# Patient Record
Sex: MALE | Race: WHITE | Employment: UNEMPLOYED | URBAN - METROPOLITAN AREA
[De-identification: names, ages, dates, MRNs, and addresses within clinical notes are randomized per-mention and may not be internally consistent; named-entity substitution may affect disease eponyms.]

---

## 2023-06-25 ENCOUNTER — HOSPITAL ENCOUNTER (EMERGENCY)
Age: 38
Discharge: HOME | End: 2023-06-25
Attending: EMERGENCY MEDICINE
Payer: COMMERCIAL

## 2023-06-25 ENCOUNTER — APPOINTMENT (OUTPATIENT)
Dept: GENERAL RADIOLOGY | Age: 38
End: 2023-06-25
Payer: COMMERCIAL

## 2023-06-25 VITALS
TEMPERATURE: 97.7 F | OXYGEN SATURATION: 99 % | DIASTOLIC BLOOD PRESSURE: 75 MMHG | HEIGHT: 74 IN | SYSTOLIC BLOOD PRESSURE: 115 MMHG | RESPIRATION RATE: 14 BRPM | HEART RATE: 50 BPM | BODY MASS INDEX: 23.1 KG/M2 | WEIGHT: 180 LBS

## 2023-06-25 DIAGNOSIS — S51.851A CAT BITE OF FOREARM, RIGHT, INITIAL ENCOUNTER: Primary | ICD-10-CM
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
## 2023-06-25 PROCEDURE — 90715 TDAP VACCINE 7 YRS/> IM: CPT | Performed by: EMERGENCY MEDICINE

## 2023-06-25 PROCEDURE — 6360000002 HC RX W HCPCS: Performed by: EMERGENCY MEDICINE

## 2023-06-25 PROCEDURE — 90471 IMMUNIZATION ADMIN: CPT | Performed by: EMERGENCY MEDICINE

## 2023-06-25 PROCEDURE — 73090 X-RAY EXAM OF FOREARM: CPT

## 2023-06-25 PROCEDURE — 99284 EMERGENCY DEPT VISIT MOD MDM: CPT

## 2023-06-25 PROCEDURE — 6370000000 HC RX 637 (ALT 250 FOR IP): Performed by: EMERGENCY MEDICINE

## 2023-06-25 RX ORDER — AMOXICILLIN AND CLAVULANATE POTASSIUM 875; 125 MG/1; MG/1
1 TABLET, FILM COATED ORAL ONCE
Status: COMPLETED | OUTPATIENT
Start: 2023-06-25 | End: 2023-06-25

## 2023-06-25 RX ORDER — AMOXICILLIN AND CLAVULANATE POTASSIUM 875; 125 MG/1; MG/1
1 TABLET, FILM COATED ORAL ONCE
Status: DISCONTINUED | OUTPATIENT
Start: 2023-06-25 | End: 2023-06-25

## 2023-06-25 RX ORDER — IBUPROFEN 400 MG/1
600 TABLET ORAL ONCE
Status: COMPLETED | OUTPATIENT
Start: 2023-06-25 | End: 2023-06-25

## 2023-06-25 RX ORDER — ACETAMINOPHEN 325 MG/1
650 TABLET ORAL ONCE
Status: COMPLETED | OUTPATIENT
Start: 2023-06-25 | End: 2023-06-25

## 2023-06-25 RX ORDER — AMOXICILLIN AND CLAVULANATE POTASSIUM 875; 125 MG/1; MG/1
1 TABLET, FILM COATED ORAL 2 TIMES DAILY
Qty: 20 TABLET | Refills: 0 | Status: SHIPPED | OUTPATIENT
Start: 2023-06-25 | End: 2023-07-05

## 2023-06-25 RX ADMIN — IBUPROFEN 600 MG: 400 TABLET, FILM COATED ORAL at 08:29

## 2023-06-25 RX ADMIN — AMOXICILLIN AND CLAVULANATE POTASSIUM 1 TABLET: 875; 125 TABLET, FILM COATED ORAL at 08:33

## 2023-06-25 RX ADMIN — ACETAMINOPHEN 650 MG: 325 TABLET ORAL at 08:29

## 2023-06-25 RX ADMIN — TETANUS TOXOID, REDUCED DIPHTHERIA TOXOID AND ACELLULAR PERTUSSIS VACCINE, ADSORBED 0.5 ML: 5; 2.5; 8; 8; 2.5 SUSPENSION INTRAMUSCULAR at 08:28

## 2023-06-25 ASSESSMENT — PAIN - FUNCTIONAL ASSESSMENT: PAIN_FUNCTIONAL_ASSESSMENT: 0-10

## 2023-06-25 ASSESSMENT — PAIN SCALES - GENERAL
PAINLEVEL_OUTOF10: 6
PAINLEVEL_OUTOF10: 3

## 2023-06-25 ASSESSMENT — PAIN DESCRIPTION - LOCATION
LOCATION: ARM
LOCATION: HAND

## 2023-06-25 ASSESSMENT — PAIN DESCRIPTION - ORIENTATION: ORIENTATION: RIGHT

## 2023-06-25 ASSESSMENT — PAIN DESCRIPTION - DESCRIPTORS: DESCRIPTORS: DISCOMFORT

## 2023-07-04 ENCOUNTER — HOSPITAL ENCOUNTER (EMERGENCY)
Age: 38
Discharge: HOME | End: 2023-07-04
Attending: EMERGENCY MEDICINE
Payer: COMMERCIAL

## 2023-07-04 VITALS
HEIGHT: 74 IN | RESPIRATION RATE: 16 BRPM | TEMPERATURE: 98.2 F | HEART RATE: 67 BPM | DIASTOLIC BLOOD PRESSURE: 69 MMHG | BODY MASS INDEX: 23.1 KG/M2 | SYSTOLIC BLOOD PRESSURE: 114 MMHG | OXYGEN SATURATION: 96 % | WEIGHT: 180 LBS

## 2023-07-04 DIAGNOSIS — J02.9 ACUTE PHARYNGITIS, UNSPECIFIED ETIOLOGY: Primary | ICD-10-CM

## 2023-07-04 PROCEDURE — 87081 CULTURE SCREEN ONLY: CPT

## 2023-07-04 PROCEDURE — 87430 STREP A AG IA: CPT

## 2023-07-04 PROCEDURE — 99283 EMERGENCY DEPT VISIT LOW MDM: CPT

## 2023-07-04 PROCEDURE — 6360000002 HC RX W HCPCS: Performed by: EMERGENCY MEDICINE

## 2023-07-04 RX ORDER — DEXAMETHASONE 4 MG/1
8 TABLET ORAL ONCE
Status: COMPLETED | OUTPATIENT
Start: 2023-07-04 | End: 2023-07-04

## 2023-07-04 RX ADMIN — DEXAMETHASONE 8 MG: 4 TABLET ORAL at 21:16

## 2023-07-04 ASSESSMENT — PAIN SCALES - GENERAL: PAINLEVEL_OUTOF10: 1

## 2023-07-04 ASSESSMENT — PAIN DESCRIPTION - LOCATION: LOCATION: THROAT

## 2023-07-04 ASSESSMENT — PAIN - FUNCTIONAL ASSESSMENT: PAIN_FUNCTIONAL_ASSESSMENT: 0-10

## 2023-07-05 NOTE — ED PROVIDER NOTES
9:28 PM        CONTINUE these medications which have NOT CHANGED    Details   amoxicillin-clavulanate (AUGMENTIN) 875-125 MG per tablet Take 1 tablet by mouth 2 times daily for 10 days, Disp-20 tablet, R-0Normal           ALLERGIES  No Known Allergies    Nursing notes reviewed by myself for past medical history, family history, social history, surgical history, current medications, and allergies. PHYSICAL EXAM  VITAL SIGNS: Triage VS:    ED Triage Vitals   Enc Vitals Group      BP       Pulse       Resp       Temp       Temp src       SpO2       Weight       Height       Head Circumference       Peak Flow       Pain Score       Pain Loc       Pain Edu? Excl. in [address] [facility] [address]? Constitutional: Well developed, Well nourished, nontoxic appearing  HENT: Normocephalic, Atraumatic, Bilateral external ears normal, mild erythema to posterior oropharynx without exudate, tolerating secretions well, nose normal.   Eyes: Conjunctiva normal, No discharge. No scleral icterus. Neck: Normal range of motion, No tenderness, Supple. Lymphatic: No lymphadenopathy noted. Cardiovascular: Normal heart rate, Normal rhythm, No murmurs, gallops or rubs. Thorax & Lungs: Normal breath sounds, No respiratory distress, No wheezing. Skin: Warm, Dry, Pink, No mottling, No erythema, No rash. Neurologic: Alert & oriented x 3,  No focal deficits noted. Psychiatric: Affect normal, Judgment normal, Mood normal.       RADIOLOGY  Labs Reviewed   STREP SCREEN GROUP A THROAT    Narrative:     SETUP DATE/TIME:       I personally reviewed the images.  The radiologist's interpretation reveals:  Last Imaging results   No orders to display       MEDS GIVEN IN ED:  Medications   dexamethasone (DECADRON) tablet 8 mg (8 mg Oral Given 7/4/23 2116)     COURSE & MEDICAL DECISION MAKING  39-year-old male presents emergency department complaints of sore throat that started 3 hours prior to arrival.  Initial vital signs are reassuring with evidence of fever

## 2023-07-07 LAB
CULTURE: NORMAL
Lab: NORMAL
SPECIMEN: NORMAL
STREP A DIRECT SCREEN: NEGATIVE

## 2023-09-18 ENCOUNTER — APPOINTMENT (OUTPATIENT)
Dept: GENERAL RADIOLOGY | Age: 38
End: 2023-09-18
Payer: COMMERCIAL

## 2023-09-18 ENCOUNTER — HOSPITAL ENCOUNTER (EMERGENCY)
Age: 38
Discharge: HOME | End: 2023-09-18
Attending: EMERGENCY MEDICINE
Payer: COMMERCIAL

## 2023-09-18 VITALS
RESPIRATION RATE: 16 BRPM | HEIGHT: 74 IN | TEMPERATURE: 98.1 F | WEIGHT: 185 LBS | SYSTOLIC BLOOD PRESSURE: 121 MMHG | BODY MASS INDEX: 23.74 KG/M2 | HEART RATE: 63 BPM | DIASTOLIC BLOOD PRESSURE: 80 MMHG | OXYGEN SATURATION: 96 %

## 2023-09-18 DIAGNOSIS — J06.9 UPPER RESPIRATORY TRACT INFECTION, UNSPECIFIED TYPE: Primary | ICD-10-CM

## 2023-09-18 LAB
SARS-COV-2 RDRP RESP QL NAA+PROBE: NOT DETECTED
SOURCE: NORMAL

## 2023-09-18 PROCEDURE — 87081 CULTURE SCREEN ONLY: CPT

## 2023-09-18 PROCEDURE — 6370000000 HC RX 637 (ALT 250 FOR IP): Performed by: EMERGENCY MEDICINE

## 2023-09-18 PROCEDURE — 87635 SARS-COV-2 COVID-19 AMP PRB: CPT

## 2023-09-18 PROCEDURE — 87430 STREP A AG IA: CPT

## 2023-09-18 PROCEDURE — 99284 EMERGENCY DEPT VISIT MOD MDM: CPT

## 2023-09-18 PROCEDURE — 71046 X-RAY EXAM CHEST 2 VIEWS: CPT

## 2023-09-18 RX ORDER — ALBUTEROL SULFATE 90 UG/1
2 AEROSOL, METERED RESPIRATORY (INHALATION) 4 TIMES DAILY PRN
Qty: 18 G | Refills: 0 | Status: SHIPPED | OUTPATIENT
Start: 2023-09-18

## 2023-09-18 RX ORDER — ALBUTEROL SULFATE 90 UG/1
2 AEROSOL, METERED RESPIRATORY (INHALATION) ONCE
Status: COMPLETED | OUTPATIENT
Start: 2023-09-18 | End: 2023-09-18

## 2023-09-18 RX ORDER — GUAIFENESIN 600 MG/1
1200 TABLET, EXTENDED RELEASE ORAL 2 TIMES DAILY
Qty: 40 TABLET | Refills: 0 | Status: SHIPPED | OUTPATIENT
Start: 2023-09-18 | End: 2023-09-28

## 2023-09-18 RX ORDER — PSEUDOEPHEDRINE HCL 30 MG
30 TABLET ORAL EVERY 6 HOURS PRN
Qty: 30 TABLET | Refills: 1 | Status: SHIPPED | OUTPATIENT
Start: 2023-09-18 | End: 2024-09-17

## 2023-09-18 RX ADMIN — ALBUTEROL SULFATE 2 PUFF: 90 AEROSOL, METERED RESPIRATORY (INHALATION) at 14:25

## 2023-09-18 ASSESSMENT — PAIN - FUNCTIONAL ASSESSMENT: PAIN_FUNCTIONAL_ASSESSMENT: 0-10

## 2023-09-18 ASSESSMENT — PAIN SCALES - GENERAL: PAINLEVEL_OUTOF10: 3

## 2023-09-18 NOTE — DISCHARGE INSTRUCTIONS
Return immediately to the emergency department if you experience new or worsened symptoms, chest pain, shortness of breath, fever, or for any other concerns.

## 2023-09-18 NOTE — ED NOTES
Patient verbalizes understanding of discharge instructions. Patient walks out of ED with an upright and steady gait with even and unlabored respirations.       Sandeep Nunez RN  09/18/23 1635

## 2023-09-20 LAB
CULTURE: NORMAL
Lab: NORMAL
SPECIMEN: NORMAL
STREP A DIRECT SCREEN: NEGATIVE

## 2023-10-20 ENCOUNTER — HOSPITAL ENCOUNTER (EMERGENCY)
Age: 38
Discharge: HOME | End: 2023-10-21
Attending: EMERGENCY MEDICINE
Payer: COMMERCIAL

## 2023-10-20 VITALS
WEIGHT: 183 LBS | TEMPERATURE: 97.8 F | HEIGHT: 74 IN | DIASTOLIC BLOOD PRESSURE: 83 MMHG | SYSTOLIC BLOOD PRESSURE: 131 MMHG | OXYGEN SATURATION: 97 % | BODY MASS INDEX: 23.49 KG/M2 | RESPIRATION RATE: 18 BRPM | HEART RATE: 82 BPM

## 2023-10-20 DIAGNOSIS — L72.3 SEBACEOUS CYST: Primary | ICD-10-CM

## 2023-10-20 PROCEDURE — 99283 EMERGENCY DEPT VISIT LOW MDM: CPT

## 2023-10-20 ASSESSMENT — PAIN SCALES - GENERAL: PAINLEVEL_OUTOF10: 1

## 2023-10-20 ASSESSMENT — PAIN DESCRIPTION - ORIENTATION: ORIENTATION: LEFT

## 2023-10-20 ASSESSMENT — PAIN DESCRIPTION - LOCATION: LOCATION: WRIST

## 2023-10-20 ASSESSMENT — PAIN - FUNCTIONAL ASSESSMENT
PAIN_FUNCTIONAL_ASSESSMENT: ACTIVITIES ARE NOT PREVENTED
PAIN_FUNCTIONAL_ASSESSMENT: 0-10

## 2023-10-21 PROCEDURE — 6370000000 HC RX 637 (ALT 250 FOR IP): Performed by: EMERGENCY MEDICINE

## 2023-10-21 RX ORDER — DOXYCYCLINE HYCLATE 100 MG
100 TABLET ORAL 2 TIMES DAILY
Qty: 14 TABLET | Refills: 0 | Status: SHIPPED | OUTPATIENT
Start: 2023-10-21 | End: 2023-10-28

## 2023-10-21 RX ORDER — DOXYCYCLINE HYCLATE 100 MG
100 TABLET ORAL ONCE
Status: COMPLETED | OUTPATIENT
Start: 2023-10-21 | End: 2023-10-21

## 2023-10-21 RX ADMIN — DOXYCYCLINE HYCLATE 100 MG: 100 TABLET, COATED ORAL at 00:19

## 2023-10-21 NOTE — ED PROVIDER NOTES
coordination intact          Psychiatric:  Appropriate      I have reviewed and interpreted all of the currently available lab results from this visit (if applicable):  No results found for this visit on 10/20/23. Radiographs (if obtained):  [] The following radiograph was interpreted by myself in the absence of a radiologist:   [] Radiologist's Report Reviewed:  No orders to display       Procedures:  None      Chart review shows recent radiographs:  No results found. MDM:     Discussion with Other Professionals : None    Social Determinants: None    Records Reviewed : None    Chronic conditions affecting care: None    diagnostics differed at this time based on clinical judgement and/or after discussion with patient/patient's family    Patient was given the following medications:  Medications   doxycycline hyclate (VIBRA-TABS) tablet 100 mg (has no administration in time range)       Disposition Discussion:  I suspect a  sebaceous cyst but possibly is infected with the erythema and warmth to the touch. Will cover with antibiotics as such. He has slightly enlarged lymph node on neck exam but this appears to be a chronic issue for him. I encouraged him to follow-up closely with his primary care doctor and return here with any change or worsening. I did give him referrals to specialist but have also asked him to coordinate with his primary care doctor first before following up. .  Does not require any further emergent work-up at this point. Plan is to discharge home with expectation to follow close with primary care doctor/orthpedist.  Recommend RICE therapy. Also recommend supportive care measures at home. Return precaution provided. All questions and concerns answered at this time. Is this patient to be included in the SEP-1 core measure due to severe sepsis or septic shock? No Exclusion criteria - the patient is NOT to be included for SEP-1 Core Measure due to:  Infection is not suspected

## 2023-11-18 ENCOUNTER — APPOINTMENT (OUTPATIENT)
Dept: GENERAL RADIOLOGY | Age: 38
End: 2023-11-18
Payer: COMMERCIAL

## 2023-11-18 ENCOUNTER — HOSPITAL ENCOUNTER (EMERGENCY)
Age: 38
Discharge: HOME | End: 2023-11-19
Attending: EMERGENCY MEDICINE
Payer: COMMERCIAL

## 2023-11-18 VITALS
BODY MASS INDEX: 24.38 KG/M2 | DIASTOLIC BLOOD PRESSURE: 91 MMHG | HEIGHT: 74 IN | HEART RATE: 76 BPM | SYSTOLIC BLOOD PRESSURE: 134 MMHG | WEIGHT: 190 LBS | TEMPERATURE: 97.6 F | OXYGEN SATURATION: 98 % | RESPIRATION RATE: 18 BRPM

## 2023-11-18 DIAGNOSIS — M25.532 LEFT WRIST PAIN: Primary | ICD-10-CM

## 2023-11-18 PROCEDURE — 6360000002 HC RX W HCPCS: Performed by: EMERGENCY MEDICINE

## 2023-11-18 PROCEDURE — 96372 THER/PROPH/DIAG INJ SC/IM: CPT

## 2023-11-18 PROCEDURE — 99284 EMERGENCY DEPT VISIT MOD MDM: CPT

## 2023-11-18 PROCEDURE — 6370000000 HC RX 637 (ALT 250 FOR IP): Performed by: EMERGENCY MEDICINE

## 2023-11-18 PROCEDURE — 73110 X-RAY EXAM OF WRIST: CPT

## 2023-11-18 RX ORDER — ACETAMINOPHEN 500 MG
1000 TABLET ORAL ONCE
Status: COMPLETED | OUTPATIENT
Start: 2023-11-19 | End: 2023-11-18

## 2023-11-18 RX ORDER — KETOROLAC TROMETHAMINE 15 MG/ML
30 INJECTION, SOLUTION INTRAMUSCULAR; INTRAVENOUS ONCE
Status: COMPLETED | OUTPATIENT
Start: 2023-11-19 | End: 2023-11-18

## 2023-11-18 RX ADMIN — ACETAMINOPHEN 1000 MG: 500 TABLET ORAL at 23:57

## 2023-11-18 RX ADMIN — KETOROLAC TROMETHAMINE 30 MG: 15 INJECTION, SOLUTION INTRAMUSCULAR; INTRAVENOUS at 23:58

## 2023-11-18 ASSESSMENT — PAIN - FUNCTIONAL ASSESSMENT: PAIN_FUNCTIONAL_ASSESSMENT: 0-10

## 2023-11-18 ASSESSMENT — PAIN DESCRIPTION - PAIN TYPE: TYPE: ACUTE PAIN

## 2023-11-18 ASSESSMENT — PAIN DESCRIPTION - DESCRIPTORS: DESCRIPTORS: SORE

## 2023-11-18 ASSESSMENT — PAIN DESCRIPTION - ORIENTATION
ORIENTATION: LEFT
ORIENTATION: LEFT

## 2023-11-18 ASSESSMENT — PAIN DESCRIPTION - LOCATION
LOCATION: HAND
LOCATION: HAND

## 2023-11-18 ASSESSMENT — PAIN DESCRIPTION - FREQUENCY: FREQUENCY: CONTINUOUS

## 2023-11-18 ASSESSMENT — PAIN SCALES - GENERAL: PAINLEVEL_OUTOF10: 5
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
## 2023-12-06 ENCOUNTER — OFFICE VISIT (OUTPATIENT)
Dept: ORTHOPEDIC SURGERY | Age: 38
End: 2023-12-06
Payer: COMMERCIAL

## 2023-12-06 DIAGNOSIS — M67.432 GANGLION CYST OF DORSUM OF LEFT WRIST: Primary | ICD-10-CM

## 2023-12-06 PROCEDURE — G8427 DOCREV CUR MEDS BY ELIG CLIN: HCPCS

## 2023-12-06 PROCEDURE — 1036F TOBACCO NON-USER: CPT

## 2023-12-06 PROCEDURE — G8484 FLU IMMUNIZE NO ADMIN: HCPCS

## 2023-12-06 PROCEDURE — G8420 CALC BMI NORM PARAMETERS: HCPCS

## 2023-12-06 PROCEDURE — 99203 OFFICE O/P NEW LOW 30 MIN: CPT

## 2023-12-06 NOTE — PROGRESS NOTES
Patient seen in office today for:  Left hand and wrist pain, cyst present. Patient seen in ED 11/18/2023    Patient reports 3/10 pain. RICE and medication are not effective to alleviate pain and reduce swelling.      Pain worsened by: Patient reports painful ROM    Xrays completed at [facility] ED 11/18/2023    Right handed

## 2023-12-07 ASSESSMENT — ENCOUNTER SYMPTOMS
COUGH: 0
RHINORRHEA: 0
FACIAL SWELLING: 0
BACK PAIN: 0
SHORTNESS OF BREATH: 0
NAUSEA: 0

## 2023-12-07 NOTE — PROGRESS NOTES
12/6/2023   Chief Complaint   Patient presents with    Consultation    Hand Pain     Left hand, cyst present        History of Present Illness:                             Dioni Carlisle is a 43 y.o. male presenting to the office today as a new patient with left wrist pain and a cyst nodule on the dorsum of the left wrist.  Patient states this nodule is intermittent in nature and comes and goes. Unfortunately, he notes the cyst formation has resolved in the interim time between making the appointment and today. He currently is having no pain and has no cyst formation at the area in question. Patient seen in office today for:  Left hand and wrist pain, cyst present. Patient seen in ED 11/18/2023     Patient reports 3/10 pain. RICE and medication are not effective to alleviate pain and reduce swelling. Pain worsened by: Patient reports painful ROM     Xrays completed at [facility] ED 11/18/2023     Right handed        Medical History  Patient's medications, allergies, past medical, surgical, social and family histories were reviewed and updated as appropriate. No past medical history on file. Past Surgical History:   Procedure Laterality Date    ABDOMEN SURGERY      BRAIN SURGERY       No family history on file. Social History     Socioeconomic History    Marital status: Single   Tobacco Use    Smoking status: Never    Smokeless tobacco: Never   Vaping Use    Vaping Use: Never used   Substance and Sexual Activity    Alcohol use: Yes     Comment: occ    Drug use: Yes     Types: Marijuana (Weed)     No current outpatient medications on file. No current facility-administered medications for this visit. No Known Allergies      Review of Systems   Constitutional:  Negative for fever. HENT:  Negative for facial swelling and rhinorrhea. Respiratory:  Negative for cough and shortness of breath. Cardiovascular:  Negative for chest pain. Gastrointestinal:  Negative for nausea.

## 2023-12-11 ENCOUNTER — HOSPITAL ENCOUNTER (EMERGENCY)
Age: 38
Discharge: HOME | End: 2023-12-11
Attending: EMERGENCY MEDICINE
Payer: COMMERCIAL

## 2023-12-11 VITALS
HEIGHT: 74 IN | BODY MASS INDEX: 25.03 KG/M2 | HEART RATE: 75 BPM | SYSTOLIC BLOOD PRESSURE: 122 MMHG | RESPIRATION RATE: 16 BRPM | OXYGEN SATURATION: 98 % | DIASTOLIC BLOOD PRESSURE: 84 MMHG | TEMPERATURE: 97.9 F | WEIGHT: 195 LBS

## 2023-12-11 DIAGNOSIS — S05.02XA ABRASION OF LEFT CORNEA, INITIAL ENCOUNTER: Primary | ICD-10-CM

## 2023-12-11 DIAGNOSIS — H10.32 ACUTE CONJUNCTIVITIS OF LEFT EYE, UNSPECIFIED ACUTE CONJUNCTIVITIS TYPE: ICD-10-CM

## 2023-12-11 PROCEDURE — 6370000000 HC RX 637 (ALT 250 FOR IP): Performed by: EMERGENCY MEDICINE

## 2023-12-11 PROCEDURE — 99283 EMERGENCY DEPT VISIT LOW MDM: CPT

## 2023-12-11 RX ORDER — TETRACAINE HYDROCHLORIDE 5 MG/ML
2 SOLUTION OPHTHALMIC ONCE
Status: COMPLETED | OUTPATIENT
Start: 2023-12-11 | End: 2023-12-11

## 2023-12-11 RX ORDER — ERYTHROMYCIN 5 MG/G
OINTMENT OPHTHALMIC ONCE
Status: COMPLETED | OUTPATIENT
Start: 2023-12-11 | End: 2023-12-11

## 2023-12-11 RX ORDER — ERYTHROMYCIN 5 MG/G
OINTMENT OPHTHALMIC
Qty: 3.5 G | Refills: 0 | Status: SHIPPED | OUTPATIENT
Start: 2023-12-11 | End: 2023-12-21

## 2023-12-11 RX ADMIN — TETRACAINE HYDROCHLORIDE 2 DROP: 5 SOLUTION OPHTHALMIC at 22:08

## 2023-12-11 RX ADMIN — ERYTHROMYCIN: 5 OINTMENT OPHTHALMIC at 22:19

## 2023-12-11 RX ADMIN — FLUORESCEIN SODIUM 1 MG: 1 STRIP OPHTHALMIC at 22:10

## 2023-12-11 ASSESSMENT — PAIN DESCRIPTION - LOCATION: LOCATION: EYE

## 2023-12-11 ASSESSMENT — PAIN DESCRIPTION - ORIENTATION: ORIENTATION: LEFT

## 2023-12-11 ASSESSMENT — PAIN - FUNCTIONAL ASSESSMENT: PAIN_FUNCTIONAL_ASSESSMENT: 0-10

## 2023-12-11 ASSESSMENT — PAIN SCALES - GENERAL: PAINLEVEL_OUTOF10: 2

## 2023-12-12 NOTE — ED NOTES
All discharge instructions gone over and all questions answered. Pt ambulated to the front of the ED with no issues.       China Pena RN  12/11/23 [id number]

## 2023-12-12 NOTE — ED PROVIDER NOTES
CHIEF COMPLAINT    Chief Complaint   Patient presents with    Eye Problem     HPI  Rafael Vela is a 43 y.o. male who presents to the ED with complaints of left eye redness and discomfort. Patient states he began to notice redness to the left eye yesterday and about 1 hour prior to arrival began to notice some discomfort to the left eye. The discomfort is described as a burning sensation exacerbated with movement of the eye. Patient states he feels as if an eyelash stuck in the eye. Symptoms are constant and rated as moderate in severity. No known trauma to the eye. Does not wear glasses or contacts. Denies headache, fevers, chills, nausea, vomiting, numbness, tingling      REVIEW OF SYSTEMS  Constitutional: No fever, chills   Eye: Complains of left eye redness and discomfort  HENT: No earache or sore throat. Resp: No SOB or productive cough. Cardio: No chest pain or palpitations. GI: No abdominal pain, nausea, vomiting, constipation or diarrhea. No melena. : No dysuria, urgency or frequency. Endocrine: No heat intolerance, no cold intolerance, no polydipsia   Lymphatics: No adenopathy  Musculoskeletal: No new muscle aches or joint pain. Neuro: No headaches. Psych: No homicidal or suicidal thoughts  Skin: No rash, No itching. ?  ? PAST MEDICAL HISTORY  History reviewed. No pertinent past medical history. FAMILY HISTORY  History reviewed. No pertinent family history.   SOCIAL HISTORY  Social History     Socioeconomic History    Marital status: Single     Spouse name: None    Number of children: None    Years of education: None    Highest education level: None   Tobacco Use    Smoking status: Never    Smokeless tobacco: Never   Vaping Use    Vaping Use: Never used   Substance and Sexual Activity    Alcohol use: Yes     Comment: occ    Drug use: Yes     Types: Marijuana Tian Goddard       SURGICAL HISTORY  Past Surgical History:   Procedure Laterality Date    ABDOMEN SURGERY      BRAIN SURGERY

## 2025-03-28 ENCOUNTER — HOSPITAL ENCOUNTER (EMERGENCY)
Age: 40
Discharge: HOME | End: 2025-03-28
Attending: EMERGENCY MEDICINE
Payer: COMMERCIAL

## 2025-03-28 VITALS
SYSTOLIC BLOOD PRESSURE: 130 MMHG | WEIGHT: 195 LBS | TEMPERATURE: 97.4 F | BODY MASS INDEX: 25.04 KG/M2 | RESPIRATION RATE: 18 BRPM | HEART RATE: 76 BPM | OXYGEN SATURATION: 99 % | DIASTOLIC BLOOD PRESSURE: 64 MMHG

## 2025-03-28 DIAGNOSIS — J02.9 ACUTE PHARYNGITIS, UNSPECIFIED ETIOLOGY: Primary | ICD-10-CM

## 2025-03-28 PROCEDURE — 99283 EMERGENCY DEPT VISIT LOW MDM: CPT

## 2025-03-28 RX ORDER — AMOXICILLIN 500 MG/1
500 CAPSULE ORAL 2 TIMES DAILY
Qty: 20 CAPSULE | Refills: 0 | Status: SHIPPED | OUTPATIENT
Start: 2025-03-28 | End: 2025-04-07

## 2025-03-28 ASSESSMENT — LIFESTYLE VARIABLES
HOW MANY STANDARD DRINKS CONTAINING ALCOHOL DO YOU HAVE ON A TYPICAL DAY: PATIENT DOES NOT DRINK
HOW OFTEN DO YOU HAVE A DRINK CONTAINING ALCOHOL: NEVER

## 2025-03-28 ASSESSMENT — ENCOUNTER SYMPTOMS
EYES NEGATIVE: 1
GASTROINTESTINAL NEGATIVE: 1
TROUBLE SWALLOWING: 1
RESPIRATORY NEGATIVE: 1

## 2025-03-29 NOTE — ED PROVIDER NOTES
The history is provided by the patient.   Pharyngitis  Location:  Generalized  Quality:  Aching  Severity:  Severe  Onset quality:  Sudden  Timing:  Constant  Progression:  Worsening  Chronicity:  New  Relieved by:  Nothing  Worsened by:  Nothing  Ineffective treatments:  None tried  Associated symptoms: adenopathy, fever and trouble swallowing    Risk factors: exposure to strep        Review of Systems   Constitutional:  Positive for fever.   HENT:  Positive for trouble swallowing.    Eyes: Negative.    Respiratory: Negative.     Cardiovascular: Negative.    Gastrointestinal: Negative.    Genitourinary: Negative.    Musculoskeletal: Negative.    Skin: Negative.    Neurological: Negative.    Hematological:  Positive for adenopathy.   All other systems reviewed and are negative.      History reviewed. No pertinent family history.  Social History     Socioeconomic History    Marital status: Single     Spouse name: Not on file    Number of children: Not on file    Years of education: Not on file    Highest education level: Not on file   Occupational History    Not on file   Tobacco Use    Smoking status: Never    Smokeless tobacco: Never   Vaping Use    Vaping status: Never Used   Substance and Sexual Activity    Alcohol use: Yes     Comment: occ    Drug use: Yes     Types: Marijuana (Weed)    Sexual activity: Not on file   Other Topics Concern    Not on file   Social History Narrative    Not on file     Social Drivers of Health     Financial Resource Strain: Low Risk  (4/14/2022)    Received from [employer]    Overall Financial Resource Strain (CARDIA)     Difficulty of Paying Living Expenses: Not hard at all   Food Insecurity: No Food Insecurity (4/14/2022)    Received from [facility]    Hunger Vital Sign     Worried About Running Out of Food in the Last Year: Never true     Ran Out of Food in the Last Year: Never true   Transportation Needs: No Transportation Needs (4/14/2022)

## 2025-03-29 NOTE — DISCHARGE INSTR - COC
Continuity of Care Form    Patient Name: Rafael Hinojosa   :  1985  MRN:  [id number]    Admit date:  3/28/2025  Discharge date:  ***    Code Status Order: No Order   Advance Directives:     Admitting Physician:  No admitting provider for patient encounter.  PCP: No primary care provider on file.    Discharging Nurse: ***  Discharging Hospital Unit/Room#: ED-01/E01  Discharging Unit Phone Number: ***    Emergency Contact:   No emergency contact information on file.    Past Surgical History:  Past Surgical History:   Procedure Laterality Date    ABDOMEN SURGERY      BRAIN SURGERY         Immunization History:   Immunization History   Administered Date(s) Administered    TDaP, ADACEL (age 10y-64y), BOOSTRIX (age 10y+), IM, 0.5mL 2023       Active Problems:  There is no problem list on file for this patient.      Isolation/Infection:   Isolation            No Isolation          Patient Infection Status    None to display              Nurse Assessment:  Last Vital Signs: /64   Pulse 76   Temp 97.4 °F (36.3 °C)   Resp 18   Wt 88.5 kg (195 lb)   SpO2 99%   BMI 25.04 kg/m²     Last documented pain score (0-10 scale):    Last Weight:   Wt Readings from Last 1 Encounters:   25 88.5 kg (195 lb)     Mental Status:  {IP PT MENTAL STATUS:}    IV Access:  { ROSEANNE IV ACCESS:650968449}    Nursing Mobility/ADLs:  Walking   {CHP DME ADLs:345049015}  Transfer  {CHP DME ADLs:649272713}  Bathing  {CHP DME ADLs:830781740}  Dressing  {CHP DME ADLs:459985439}  Toileting  {CHP DME ADLs:873157757}  Feeding  {CHP DME ADLs:749023164}  Med Admin  {CHP DME ADLs:398275845}  Med Delivery   { ROSEANNE MED Delivery:333462229}    Wound Care Documentation and Therapy:        Elimination:  Continence:   Bowel: {YES / NO:}  Bladder: {YES / NO:}  Urinary Catheter: {Urinary Catheter:763600375}   Colostomy/Ileostomy/Ileal Conduit: {YES / NO:}       Date of Last BM: ***  No intake or output data in the 24 hours

## 2025-06-02 ENCOUNTER — HOSPITAL ENCOUNTER (EMERGENCY)
Age: 40
Discharge: HOME | End: 2025-06-02
Attending: EMERGENCY MEDICINE
Payer: COMMERCIAL

## 2025-06-02 VITALS
OXYGEN SATURATION: 96 % | HEIGHT: 74 IN | SYSTOLIC BLOOD PRESSURE: 115 MMHG | DIASTOLIC BLOOD PRESSURE: 63 MMHG | RESPIRATION RATE: 16 BRPM | BODY MASS INDEX: 23.74 KG/M2 | HEART RATE: 65 BPM | WEIGHT: 185 LBS | TEMPERATURE: 98.5 F

## 2025-06-02 DIAGNOSIS — S00.01XA ABRASION OF SCALP, INITIAL ENCOUNTER: ICD-10-CM

## 2025-06-02 DIAGNOSIS — S09.90XA CLOSED HEAD INJURY, INITIAL ENCOUNTER: Primary | ICD-10-CM

## 2025-06-02 PROCEDURE — 99282 EMERGENCY DEPT VISIT SF MDM: CPT

## 2025-06-02 ASSESSMENT — PAIN SCALES - GENERAL: PAINLEVEL_OUTOF10: 2

## 2025-06-02 ASSESSMENT — PAIN - FUNCTIONAL ASSESSMENT: PAIN_FUNCTIONAL_ASSESSMENT: 0-10
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
## 2025-09-01 ENCOUNTER — HOSPITAL ENCOUNTER (EMERGENCY)
Age: 40
Discharge: HOME | End: 2025-09-01
Attending: EMERGENCY MEDICINE
Payer: COMMERCIAL

## 2025-09-01 VITALS
HEART RATE: 78 BPM | BODY MASS INDEX: 22.84 KG/M2 | DIASTOLIC BLOOD PRESSURE: 62 MMHG | HEIGHT: 74 IN | OXYGEN SATURATION: 97 % | WEIGHT: 178 LBS | SYSTOLIC BLOOD PRESSURE: 133 MMHG | TEMPERATURE: 98 F | RESPIRATION RATE: 16 BRPM

## 2025-09-01 DIAGNOSIS — J02.9 ACUTE PHARYNGITIS, UNSPECIFIED ETIOLOGY: Primary | ICD-10-CM

## 2025-09-01 LAB
S PYO AG THROAT QL: NEGATIVE
SARS-COV-2 RDRP RESP QL NAA+PROBE: NOT DETECTED
SPECIMEN DESCRIPTION: NORMAL
SPECIMEN SOURCE: NORMAL

## 2025-09-01 PROCEDURE — 87880 STREP A ASSAY W/OPTIC: CPT

## 2025-09-01 PROCEDURE — 87635 SARS-COV-2 COVID-19 AMP PRB: CPT

## 2025-09-01 PROCEDURE — 87081 CULTURE SCREEN ONLY: CPT

## 2025-09-01 PROCEDURE — 99283 EMERGENCY DEPT VISIT LOW MDM: CPT

## 2025-09-04 LAB
MICROORGANISM SPEC CULT: NORMAL
SPECIMEN DESCRIPTION: NORMAL